# Patient Record
Sex: FEMALE | Race: WHITE | Employment: FULL TIME | URBAN - METROPOLITAN AREA
[De-identification: names, ages, dates, MRNs, and addresses within clinical notes are randomized per-mention and may not be internally consistent; named-entity substitution may affect disease eponyms.]

---

## 2021-09-14 ENCOUNTER — HOSPITAL ENCOUNTER (EMERGENCY)
Age: 31
Discharge: HOME | End: 2021-09-14
Payer: MEDICAID

## 2021-09-14 VITALS
WEIGHT: 189.38 LBS | TEMPERATURE: 98.9 F | HEART RATE: 74 BPM | OXYGEN SATURATION: 99 % | DIASTOLIC BLOOD PRESSURE: 64 MMHG | SYSTOLIC BLOOD PRESSURE: 116 MMHG | HEIGHT: 67 IN | RESPIRATION RATE: 16 BRPM | BODY MASS INDEX: 29.72 KG/M2

## 2021-09-14 DIAGNOSIS — Z48.02 VISIT FOR SUTURE REMOVAL: Primary | ICD-10-CM

## 2021-09-14 PROCEDURE — 99282 EMERGENCY DEPT VISIT SF MDM: CPT

## 2021-09-14 ASSESSMENT — PAIN DESCRIPTION - DESCRIPTORS: DESCRIPTORS: SORE

## 2021-09-14 ASSESSMENT — PAIN DESCRIPTION - ORIENTATION: ORIENTATION: RIGHT

## 2021-09-14 ASSESSMENT — PAIN DESCRIPTION - LOCATION: LOCATION: LEG

## 2021-09-14 ASSESSMENT — PAIN SCALES - GENERAL: PAINLEVEL_OUTOF10: 3

## 2021-09-14 NOTE — ED NOTES
D/C: Order noted for d/c. Pt confirmed d/c paperwork . Discharge and education instructions reviewed with patient. Teach-back successful. Pt verbalized understanding and signed d/c papers. Pt denied questions at this time. No acute distress noted. Patient instructed to follow-up as noted - return to emergency department if symptoms worsen. Patient verbalized understanding. Discharged per EDMD with discharge instructions. Pt discharged to private vehicle. Patient stable upon departure. Thanked patient for choosing [facility]) for care. Provider aware of patient pain at time of discharge.      Jere Roach RN  09/14/21 [id number]

## 2021-09-14 NOTE — ED PROVIDER NOTES
the wound. She was instructed not to pick or pull at the Steri-Strips and to let them fall off naturally. I instructed the patient to follow up as an outpatient in 2 days. I instructed the patient to return to the ED immediately for any new or worsening symptoms. The patient verbalizes understanding. FINAL IMPRESSION    1.  Visit for suture removal        PLAN  Discharge with outpatient follow-up (see EMR)      (Please note that this note was completed with a voice recognition program.  Every attempt was made to edit the dictations, but inevitably there remain words that are mis-transcribed.)         TERI Mack - CNP  09/14/21 1554